# Patient Record
(demographics unavailable — no encounter records)

---

## 2024-10-10 NOTE — CONSULT LETTER
[Dear  ___] : Dear  [unfilled], [Courtesy Letter:] : I had the pleasure of seeing your patient, [unfilled], in my office today. [Please see my note below.] : Please see my note below. [Consult Closing:] : Thank you very much for allowing me to participate in the care of this patient.  If you have any questions, please do not hesitate to contact me. [Sincerely,] : Sincerely, [FreeTextEntry3] : Katy Iqbal MD Attending Physician Pediatric Gastroenterology and Nutrition

## 2024-10-10 NOTE — PHYSICAL EXAM
[Well Developed] : well developed [Well Nourished] : well nourished [NAD] : in no acute distress [PERRL] : pupils were equal, round, reactive to light  [Moist & Pink Mucous Membranes] : moist and pink mucous membranes [CTAB] : lungs clear to auscultation bilaterally [Regular Rate and Rhythm] : regular rate and rhythm [Normal S1, S2] : normal S1 and S2 [Soft] : soft  [Normal Bowel Sounds] : normal bowel sounds [No HSM] : no hepatosplenomegaly appreciated [Normal Tone] : normal tone [Well-Perfused] : well-perfused [Interactive] : interactive [Appropriate Behavior] : appropriate behavior [icteric] : anicteric [Respiratory Distress] : no respiratory distress  [Wheeze] : no wheezing  [Murmur] : no murmur [Distended] : non distended [Tender] : non tender [Stool Palpable] : no stool palpable [Mass ___ cm] : no masses were palpated [Edema] : no edema [Cyanosis] : no cyanosis [Rash] : no rash [Jaundice] : no jaundice

## 2024-10-10 NOTE — ASSESSMENT
[FreeTextEntry1] : 4 month-old female with history of dilated loops of bowel prenatally who was found to have dilated bowel loops on x-ray after birth.  She had normal barium enema.  Meconium passage was normal and she was having soft stools in the nursery and until the first month of life.  Recently she has been having infrequent hard large stools that mom describes [place] 3 with significant straining and discomfort.  An x-ray was done about 2 days ago showing a possible mass in the left upper quadrant which looks like it could be stool.  Overall concerning for Hirschsprung's or another anatomical issue as it is unusual to have a large amount of stool in the splenic flexure.  Recommend: -Discussed the case with her surgeon Dr. Roque and we both agree that she will need an evaluation for Hirschsprung's disease and we will start with a rectal suction biopsy. -Ultrasound to evaluate with more detailed the mass visualized on the x-ray -Add 1 to 2 ounces of daily prune juice - Family instructed to call if any questions/concerns, recommend they set up a follow-up visit in 1 mo

## 2024-10-10 NOTE — HISTORY OF PRESENT ILLNESS
[de-identified] : This is a 4-month-old female here for follow-up of gagging and gassiness.  She is having a lot of burping and gassiness without improvement on the Pepcid her pediatrician prescribed.  Of note she was followed prenatally for dilated intestinal loops.  After birth she passed meconium and was stooling spontaneously in the nursery.  She was evaluated by surgery and she had serial x-rays some of which showed dilated bowel loops.  She had a barium enema prior to discharge which was normal with no transition zone.  I looked at the x-rays and barium enema reports and read the notes in the EMR again today.  She is here today for follow-up.  After the last visit they went for a lip and tongue tie ( laser) and she seems to be improving. she still has reflux and if she lies flat she coughs, spits up so they keeps her upright. She does have a lot of the burping issues. Bedtime is 8:30 and she will wake up to burp, the burping last night while then after 10pm she sleeps for 2-3 hours and feeds. Has a lot of middle of the night wakenings, sometimes she is trying to stool. The first few hrs after putting her down are rough. She has a feed around 5/6 AM and then goes back to sleep then is not hungry. She is stooling 1x every 5 days. From Sat - today she stooled 2x. She has a liquidy stool every 5 days and then has solid little stools.  Stools look like [place] 3 with cracks.  She is small amounts of intermittent reflux, non bilious. She has been on kendamil goat milk formula. Mom tried nutramigen 2-3 wks and she was also constipated and had hard stools and went back to the kendamil. More reflux with the nutramigen.  Stool were still [place] 3 while on Nutramigen.  She had a repeat x-ray that reviewed on mom's phone which showed a possible mass in the left upper quadrant that appeared to be stool.  Excellent weight gain  She is still on the kendamil and just turned 6 mo yesterday. She is not sitting straight  She was stooling 1x per wk and crying and uncomfortable. Mom DC the prunes as it was not helping with the freq, seems to have trouble pushing her stool out. Sunday [place] 3 stool with blood, streak of blood.  She strains and pushes and is uncomfortable.

## 2024-10-10 NOTE — ASSESSMENT
[FreeTextEntry1] : 4 month-old female with history of dilated loops of bowel prenatally who was found to have dilated bowel loops on x-ray after birth.  She had normal barium enema.  Meconium passage was normal and she was having soft stools in the nursery and until the first month of life.  Recently she has been having infrequent hard large stools that mom describes [place] 3 with significant straining and discomfort.  An x-ray was done about 2 days ago showing a possible mass in the left upper quadrant which looks like it could be stool.  Overall concerning for Hirschsprung's or another anatomical issue as it is unusual to have a large amount of stool in the splenic flexure.  Recommend: -Discussed the case with her surgeon Dr. Roque and we both agree that she will need an evaluation for Hirschsprung's disease and we will start with a rectal suction biopsy. -Ultrasound to evaluate with more detailed the mass visualized on the x-ray -Add 1 to 2 ounces of daily prune juice - Family instructed to call if any questions/concerns, recommend they set up a follow-up visit in 1 mo

## 2024-10-17 NOTE — PHYSICAL EXAM
[Alert] : alert [Acute Distress] : no acute distress [Normocephalic] : normocephalic [Flat Open Anterior [place]] : flat open anterior fontanelle [Red Reflex] : red reflex bilateral [PERRL] : PERRL [Normally Placed Ears] : normally placed ears [Auricles Well Formed] : auricles well formed [Clear Tympanic membranes] : clear tympanic membranes [Light reflex present] : light reflex present [Bony landmarks visible] : bony landmarks visible [Discharge] : no discharge [Nares Patent] : nares patent [Palate Intact] : palate intact [Uvula Midline] : uvula midline [Tooth Eruption] : no tooth eruption [Supple, full passive range of motion] : supple, full passive range of motion [Palpable Masses] : no palpable masses [Symmetric Chest Rise] : symmetric chest rise [Clear to Auscultation Bilaterally] : clear to auscultation bilaterally [Regular Rate and Rhythm] : regular rate and rhythm [S1, S2 present] : S1, S2 present [Murmurs] : no murmurs [+2 Femoral Pulses] : (+) 2 femoral pulses [Soft] : soft [Tender] : nontender [Distended] : nondistended [Bowel Sounds] : bowel sounds present [Hepatomegaly] : no hepatomegaly [Splenomegaly] : no splenomegaly [Normal External Genitalia] : normal external genitalia [Clitoromegaly] : no clitoromegaly [Normal Vaginal Introitus] : normal vaginal introitus [Patent] : patent [Normally Placed] : normally placed [No Abnormal Lymph Nodes Palpated] : no abnormal lymph nodes palpated [Merrill-Ortolani] : negative Merrill-Ortolani [Allis Sign] : negative Allis sign [Symmetric Buttocks Creases] : symmetric buttocks creases [Spinal Dimple] : no spinal dimple [Tuft of Hair] : no tuft of hair [Plantar Grasp] : plantar grasp reflex present [Cranial Nerves Grossly Intact] : cranial nerves grossly intact [Rash or Lesions] : no rash/lesions

## 2024-10-17 NOTE — HISTORY OF PRESENT ILLNESS
[Formula ___ oz/feed] : [unfilled] oz of formula per feed [Fruits] : fruits [Vegetables] : vegetables [___ voids per day] : [unfilled] voids per day [Frequency of stools: ___] : Frequency of stools: [unfilled]  stools [every ___ day(s)] : every [unfilled] day(s). [Firm] : firm consistency [In Bassinet/Crib] : sleeps in bassinet/crib [On back] : sleeps on back [Co-sleeping] : no co-sleeping [Sleeps 12-16 hours per 24 hours (including naps)] : sleeps 12-16 hours per 24 hours (including naps) [Tummy time] : tummy time [No] : No cigarette smoke exposure [Exposure to electronic nicotine delivery system] : No exposure to electronic nicotine delivery system [Water heater temperature set at <120 degrees F] : Water heater temperature set at <120 degrees F [Rear facing car seat in back seat] : Rear facing car seat in back seat [Carbon Monoxide Detectors] : Carbon monoxide detectors at home [Smoke Detectors] : Smoke detectors at home.

## 2024-10-17 NOTE — DEVELOPMENTAL MILESTONES
[Normal Development] : Normal Development [Rolls over prone to supine] : does not roll over prone to supine

## 2024-10-17 NOTE — DISCUSSION/SUMMARY
[Family Functioning] : family functioning [Nutrition and Feeding] : nutrition and feeding [Infant Development] : infant development [Oral Health] : oral health [Safety] : safety [] : The components of the vaccine(s) to be administered today are listed in the plan of care. The disease(s) for which the vaccine(s) are intended to prevent and the risks have been discussed with the caretaker.  The risks are also included in the appropriate vaccination information statements which have been provided to the patient's caregiver.  The caregiver has given consent to vaccinate. [FreeTextEntry1] : Recommend breastfeeding, 8-12 feedings per day. If formula is needed, 2-4 oz every 3-4 hrs. Introduce single-ingredient foods rich in iron, one at a time. Incorporate up to 4 oz of flourinated water daily in a sippy cup. When teeth erupt wipe daily with washcloth. When in car, patient should be in rear-facing car seat in back seat. Put baby to sleep on back, in own crib with no loose or soft bedding. Lower crib matress. Help baby to maintain sleep and feeding routines. May offer pacifier if needed. Continue tummy time when awake. Ensure home is safe since baby is now more mobile. Do not use infant walker. Read aloud to baby.  Follows GI for chronic constipation- to follow in 6-7 weeks. did not start miralax yet. advised to start

## 2025-01-09 NOTE — DISCUSSION/SUMMARY
[Normal Growth] : growth [Normal Development] : development [None] : No known medical problems [No Elimination Concerns] : elimination [No Feeding Concerns] : feeding [No Skin Concerns] : skin [Normal Sleep Pattern] : sleep [Family Adaptation] : family adaptation [Infant [place]] : infant independence [Feeding Routine] : feeding routine [Safety] : safety [No Medications] : ~He/She~ is not on any medications [Parent/Guardian] : parent/guardian [FreeTextEntry1] : Continue breastmilk or formula as desired. Increase table foods, 3 meals with 2-3 snacks per day. Incorporate up to 6 oz of flourinated water daily in a sippy cup. Discussed weaning of bottle and pacifier. Wipe teeth daily with washcloth. When in car, patient should be in rear-facing car seat in back seat. Put baby to sleep in own crib with no loose or soft bedding. Lower crib matress. Help baby to maintain consistent daily routines and sleep schedule. Recognize stranger anxiety. Ensure home is safe since baby is increasingly mobile. Be within arm's reach of baby at all times. Use consistent, positive discipline. Avoid screen time. Read aloud to baby.   declined flu vaccine

## 2025-01-09 NOTE — HISTORY OF PRESENT ILLNESS
[Mother] : mother [Formula ___ oz in 24 hrs] : [unfilled] oz of formula in 24 hours [Fruit] : fruit [Vegetables] : vegetables [Cereal] : cereal [Meat] : meat [Dairy] : dairy [Water] : water [Normal] : Normal [___ voids per day] : [unfilled] voids per day [Frequency of stools: ___] : Frequency of stools: [unfilled]  stools [every other day] : every other day. [Pasty] : pasty [In Crib] : sleeps in crib [On back] : sleeps on back [Wakes up at night] : wakes up at night [Sleeps 12-16 hours per 24 hours (including naps)] : sleeps 12-16 hours per 24 hours (including naps) [Brushing teeth] : brushing teeth [No] : Not at  exposure [Water heater temperature set at <120 degrees F] : Water heater temperature set at <120 degrees F [Rear facing car seat in  back seat] : Rear facing car seat in  back seat [Carbon Monoxide Detectors] : Carbon monoxide detectors [Smoke Detectors] : Smoke detectors [Co-sleeping] : no co-sleeping

## 2025-01-09 NOTE — PHYSICAL EXAM
[Alert] : alert [Normocephalic] : normocephalic [Flat Open Anterior [place]] : flat open anterior fontanelle [Red Reflex] : red reflex bilateral [PERRL] : PERRL [Normally Placed Ears] : normally placed ears [Auricles Well Formed] : auricles well formed [Clear Tympanic membranes] : clear tympanic membranes [Light reflex present] : light reflex present [Bony landmarks visible] : bony landmarks visible [Nares Patent] : nares patent [Palate Intact] : palate intact [Uvula Midline] : uvula midline [Supple, full passive range of motion] : supple, full passive range of motion [Symmetric Chest Rise] : symmetric chest rise [Clear to Auscultation Bilaterally] : clear to auscultation bilaterally [Regular Rate and Rhythm] : regular rate and rhythm [S1, S2 present] : S1, S2 present [+2 Femoral Pulses] : (+) 2 femoral pulses [Soft] : soft [Bowel Sounds] : bowel sounds present [Normal External Genitalia] : normal external genitalia [Normal Vaginal Introitus] : normal vaginal introitus [No Abnormal Lymph Nodes Palpated] : no abnormal lymph nodes palpated [Symmetric abduction and rotation of hips] : symmetric abduction and rotation of hips [Straight] : straight [Cranial Nerves Grossly Intact] : cranial nerves grossly intact [Acute Distress] : no acute distress [Excessive Tearing] : no excessive tearing [Discharge] : no discharge [Palpable Masses] : no palpable masses [Murmurs] : no murmurs [Tender] : nontender [Distended] : nondistended [Hepatomegaly] : no hepatomegaly [Splenomegaly] : no splenomegaly [Clitoromegaly] : no clitoromegaly [Allis Sign] : negative Allis sign [Rash or Lesions] : no rash/lesions

## 2025-02-12 NOTE — HISTORY OF PRESENT ILLNESS
[de-identified] : RUNNING TEMP., BODY ACHES, NOT EATING SINCE MONDAY [FreeTextEntry6] : c/o fever, cough , runny nose x 2 days decreased appetite, drinking ok, normal UOP  1.82 no

## 2025-02-12 NOTE — DISCUSSION/SUMMARY
[FreeTextEntry1] : COVID + Patient  has COVID-19 Infection. Signs and symptoms discussed with patient. Discussed contagiousness.  Mask if outdoors and older than 2 years old.   Supportive care discussed including acetaminophen for fever, pain or myalgia; cough/cold medications for symptoms. Saline nasal spray or nebs prn. Patient to check temperature daily. Monitor for symptoms of respiratory distress.   Nature of disease to cause severe respiratory distress day 8 or 9 discussed. If needs emergent care to notify EMS or ED or our office that he may have COVID to allow for proper PPE and isolation.  F/U prn  FLU A/B neg  LOM - Complete antibiotics as prescribed. Supportive care. Provide tylenol/ ibuprofen as needed for pain or fever. If no improvement within 48 hours return for re-evaluation. Follow up in 2 weeks for recheck.

## 2025-04-10 NOTE — DISCUSSION/SUMMARY
[Normal Growth] : growth [Normal Development] : development [None] : No known medical problems [No Elimination Concerns] : elimination [No Feeding Concerns] : feeding [No Skin Concerns] : skin [Normal Sleep Pattern] : sleep [No Medications] : ~He/She~ is not on any medications [Parent/Guardian] : parent/guardian [] : The components of the vaccine(s) to be administered today are listed in the plan of care. The disease(s) for which the vaccine(s) are intended to prevent and the risks have been discussed with the caretaker.  The risks are also included in the appropriate vaccination information statements which have been provided to the patient's caregiver.  The caregiver has given consent to vaccinate. [FreeTextEntry1] : One year well visit: Parental concerns: none Recent injury/illness: none Special health care needs:  none Visits to other health care providers/facilities:  none Changes/stressors in family or home: none Parent/child interaction: normal (child "checks back" visually; toddler brings object to parent; proper response to praise; good sibling interaction; parent is positive about child) Observation of parent-child interaction: normal (toddler checks back with parents visually; toddler brings object to show parent; parent receives praise of self or child well; appropriate sibling interaction; parent seems positive about child) *** ORDERED CBC/LEAD LEVEL ***

## 2025-04-10 NOTE — HISTORY OF PRESENT ILLNESS
[Normal] : Normal [No] : No cigarette smoke exposure [Water heater temperature set at <120 degrees F] : Water heater temperature set at <120 degrees F [Car seat in back seat] : Car seat in back seat [Smoke Detectors] : Smoke detectors [Carbon Monoxide Detectors] : Carbon monoxide detectors [NO] : No [At risk for exposure to TB] : Not at risk for exposure to Tuberculosis [FreeTextEntry1] : One year well visit: Parental concerns: none Recent injury/illness: none Special health care needs:  none Visits to other health care providers/facilities:  none Changes/stressors in family or home: none Parent/child interaction: normal (child "checks back" visually; toddler brings object to parent; proper response to praise; good sibling interaction; parent is positive about child) Observation of parent-child interaction: normal (toddler checks back with parents visually; toddler brings object to show parent; parent receives praise of self or child well; appropriate sibling interaction; parent seems positive about child) *** ORDERED CBC/LEAD LEVEL ***

## 2025-04-10 NOTE — PHYSICAL EXAM
[Alert] : alert [Normocephalic] : normocephalic [Closed Anterior [place]] : closed anterior fontanelle [Red Reflex] : red reflex bilateral [PERRL] : PERRL [Normally Placed Ears] : normally placed ears [Auricles Well Formed] : auricles well formed [Clear Tympanic membranes] : clear tympanic membranes [Light reflex present] : light reflex present [Bony landmarks visible] : bony landmarks visible [Nares Patent] : nares patent [Palate Intact] : palate intact [Uvula Midline] : uvula midline [Tooth Eruption] : tooth eruption [Supple, full passive range of motion] : supple, full passive range of motion [Symmetric Chest Rise] : symmetric chest rise [Clear to Auscultation Bilaterally] : clear to auscultation bilaterally [Regular Rate and Rhythm] : regular rate and rhythm [S1, S2 present] : S1, S2 present [+2 Femoral Pulses] : (+) 2 femoral pulses [Soft] : soft [Bowel Sounds] : normoactive bowel sounds [Normal External Genitalia] : normal external genitalia [Normal Vaginal Introitus] : normal vaginal introitus [No Abnormal Lymph Nodes Palpated] : no abnormal lymph nodes palpated [Symmetric Abduction and Rotation of Hips] : symmetric abduction and rotation of hips [Straight] : straight [Cranial Nerves Grossly Intact] : cranial nerves grossly intact [Discharge] : no discharge [Palpable Masses] : no palpable masses [Murmurs] : no murmurs [Tender] : nontender [Distended] : nondistended [Hepatomegaly] : no hepatomegaly [Splenomegaly] : no splenomegaly [Clitoromegaly] : no clitoromegaly [Allis Sign] : negative Allis sign [Rash or Lesions] : no rash/lesions